# Patient Record
Sex: FEMALE | Race: WHITE | NOT HISPANIC OR LATINO | Employment: OTHER | URBAN - METROPOLITAN AREA
[De-identification: names, ages, dates, MRNs, and addresses within clinical notes are randomized per-mention and may not be internally consistent; named-entity substitution may affect disease eponyms.]

---

## 2019-07-31 DIAGNOSIS — I10 HYPERTENSION, UNSPECIFIED TYPE: Primary | ICD-10-CM

## 2019-07-31 DIAGNOSIS — I10 HYPERTENSION, UNSPECIFIED TYPE: ICD-10-CM

## 2019-07-31 RX ORDER — LISINOPRIL 10 MG/1
10 TABLET ORAL DAILY
COMMUNITY
End: 2019-07-31 | Stop reason: SDUPTHER

## 2019-07-31 RX ORDER — LISINOPRIL 10 MG/1
10 TABLET ORAL DAILY
Qty: 90 TABLET | Refills: 0 | Status: SHIPPED | OUTPATIENT
Start: 2019-07-31 | End: 2019-07-31 | Stop reason: SDUPTHER

## 2019-07-31 RX ORDER — LISINOPRIL 10 MG/1
10 TABLET ORAL DAILY
Qty: 90 TABLET | Refills: 0 | Status: SHIPPED | OUTPATIENT
Start: 2019-07-31 | End: 2019-08-12

## 2019-08-12 ENCOUNTER — OFFICE VISIT (OUTPATIENT)
Dept: FAMILY MEDICINE CLINIC | Facility: CLINIC | Age: 79
End: 2019-08-12

## 2019-08-12 VITALS
HEART RATE: 68 BPM | BODY MASS INDEX: 21.97 KG/M2 | DIASTOLIC BLOOD PRESSURE: 80 MMHG | SYSTOLIC BLOOD PRESSURE: 142 MMHG | HEIGHT: 63 IN | TEMPERATURE: 98.2 F | OXYGEN SATURATION: 98 % | WEIGHT: 124 LBS

## 2019-08-12 DIAGNOSIS — Z00.00 MEDICARE ANNUAL WELLNESS VISIT, SUBSEQUENT: Primary | ICD-10-CM

## 2019-08-12 DIAGNOSIS — I10 ESSENTIAL HYPERTENSION: ICD-10-CM

## 2019-08-12 DIAGNOSIS — Z71.89 DNR (DO NOT RESUSCITATE) DISCUSSION: ICD-10-CM

## 2019-08-12 DIAGNOSIS — C56.9 PRIMARY MALIGNANT NEOPLASM OF OVARY, UNSPECIFIED LATERALITY (HCC): ICD-10-CM

## 2019-08-12 DIAGNOSIS — H61.23 BILATERAL IMPACTED CERUMEN: ICD-10-CM

## 2019-08-12 PROBLEM — C78.00: Status: ACTIVE | Noted: 2017-03-20

## 2019-08-12 PROBLEM — C79.31 MALIGNANT NEOPLASM METASTATIC TO BRAIN (HCC): Status: ACTIVE | Noted: 2017-03-17

## 2019-08-12 PROBLEM — C79.51 BONE METASTASES: Status: ACTIVE | Noted: 2017-08-31

## 2019-08-12 PROBLEM — Z79.899 MEDICATION MANAGEMENT: Status: ACTIVE | Noted: 2018-04-18

## 2019-08-12 PROCEDURE — 99214 OFFICE O/P EST MOD 30 MIN: CPT | Performed by: FAMILY MEDICINE

## 2019-08-12 PROCEDURE — G0439 PPPS, SUBSEQ VISIT: HCPCS | Performed by: FAMILY MEDICINE

## 2019-08-12 PROCEDURE — 69210 REMOVE IMPACTED EAR WAX UNI: CPT | Performed by: FAMILY MEDICINE

## 2019-08-12 NOTE — PROGRESS NOTES
The ABCs of the Annual Wellness Visit  Subsequent Medicare Wellness Visit    Chief Complaint   Patient presents with   • Hypertension     Follow up   • Ear Fullness       Subjective   History of Present Illness:  Kadie Payne is a 78 y.o. female who presents for a Subsequent Medicare Wellness Visit.    htn- doing well on meds, is mostly 110/70    Ovarian cancer- is on meds/chemo, following with onc. Has had radiation.     Ear fullness for 3 months.  Having trouble hearing.     Declining mammograms and colonoscopy etc. Her oncologist recommended she go with hospice but they wont let her be on chemo and she still wants this as it makes her feel better. Pt is a DNR.     HEALTH RISK ASSESSMENT    Recent Hospitalizations:  No hospitalization(s) within the last year.    Current Medical Providers:  Patient Care Team:  Alecia Panchal MD as PCP - General (Family Medicine)    Smoking Status:  Social History     Tobacco Use   Smoking Status Never Smoker   Smokeless Tobacco Never Used       Alcohol Consumption:  Social History     Substance and Sexual Activity   Alcohol Use No   • Frequency: Never       Depression Screen:   PHQ-2/PHQ-9 Depression Screening 8/12/2019   Little interest or pleasure in doing things 0   Feeling down, depressed, or hopeless 0   Trouble falling or staying asleep, or sleeping too much 1   Feeling tired or having little energy 0   Poor appetite or overeating 0   Feeling bad about yourself - or that you are a failure or have let yourself or your family down 0   Trouble concentrating on things, such as reading the newspaper or watching television 1   Moving or speaking so slowly that other people could have noticed. Or the opposite - being so fidgety or restless that you have been moving around a lot more than usual 0   Thoughts that you would be better off dead, or of hurting yourself in some way 0   Total Score 2   If you checked off any problems, how difficult have these problems made it for you to  do your work, take care of things at home, or get along with other people? -       Fall Risk Screen:  NISSA Fall Risk Assessment was completed, and patient is at MODERATE risk for falls. Assessment completed on:8/12/2019    Health Habits and Functional and Cognitive Screening:  Functional & Cognitive Status 8/12/2019   Do you have difficulty preparing food and eating? No   Do you have difficulty bathing yourself, getting dressed or grooming yourself? Yes   Do you have difficulty using the toilet? Yes   Do you have difficulty moving around from place to place? Yes   Do you have trouble with steps or getting out of a bed or a chair? Yes   Current Diet Well Balanced Diet   Dental Exam Not up to date   Eye Exam Not up to date   Exercise (times per week) 0 times per week   Do you need help using the phone?  No   Are you deaf or do you have serious difficulty hearing?  No   Do you need help with transportation? Yes   Do you need help shopping? Yes   Do you need help preparing meals?  Yes   Do you need help with housework?  Yes   Do you need help with laundry? Yes   Do you need help taking your medications? Yes   Do you need help managing money? Yes   Do you ever drive or ride in a car without wearing a seat belt? No   Have you felt unusual stress, anger or loneliness in the last month? No   Who do you live with? Spouse   If you need help, do you have trouble finding someone available to you? No   Have you been bothered in the last four weeks by sexual problems? No   Do you have difficulty concentrating, remembering or making decisions? Yes         Does the patient have evidence of cognitive impairment? Yes    Asprin use counseling:Does not need ASA (and currently is not on it)    Age-appropriate Screening Schedule:  Refer to the list below for future screening recommendations based on patient's age, sex and/or medical conditions. Orders for these recommended tests are listed in the plan section. The patient has been  "provided with a written plan.    Health Maintenance   Topic Date Due   • TDAP/TD VACCINES (1 - Tdap) 09/23/1961   • ZOSTER VACCINE (1 of 2) 09/23/1992   • PNEUMOCOCCAL VACCINES (65+ LOW/MEDIUM RISK) (1 of 2 - PCV13) 09/23/2007   • INFLUENZA VACCINE  08/01/2019          The following portions of the patient's history were reviewed and updated as appropriate: allergies, current medications, past family history, past medical history, past social history, past surgical history and problem list.    Outpatient Medications Prior to Visit   Medication Sig Dispense Refill   • lisinopril (PRINIVIL,ZESTRIL) 10 MG tablet Take 1 tablet by mouth Daily. 90 tablet 0     No facility-administered medications prior to visit.        Patient Active Problem List   Diagnosis   • Bone metastases (CMS/HCC)   • Carcinoma metastatic to lung (CMS/HCC)   • DNR (do not resuscitate) discussion   • Essential hypertension   • Malignant neoplasm metastatic to brain (CMS/HCC)   • Primary malignant neoplasm of ovary (CMS/HCC)   • Medication management   • Medicare annual wellness visit, subsequent   • Bilateral impacted cerumen       Advanced Care Planning:  Patient has an advance directive - a copy has not been provided. Have asked the patient to send this to us to add to record    Review of Systems   Respiratory: Negative for shortness of breath.    Cardiovascular: Negative for chest pain.       Compared to one year ago, the patient feels her physical health is worse.  Compared to one year ago, the patient feels her mental health is worse.    Reviewed chart for potential of high risk medication in the elderly: no  Reviewed chart for potential of harmful drug interactions in the elderly:no    Objective         Vitals:    08/12/19 1358   BP: 142/80   Pulse: 68   Temp: 98.2 °F (36.8 °C)   TempSrc: Temporal   SpO2: 98%   Weight: 56.2 kg (124 lb)   Height: 160 cm (63\")       Body mass index is 21.97 kg/m².  Discussed the patient's BMI with her. The BMI " is in the acceptable range.    Physical Exam   Constitutional: She is oriented to person, place, and time. She appears well-developed and well-nourished.   HENT:   Head: Normocephalic and atraumatic.   Cerumen impaction bilat.    Neck: Normal range of motion. Neck supple.   Cardiovascular: Normal rate, regular rhythm, normal heart sounds and intact distal pulses.   Pulmonary/Chest: Effort normal and breath sounds normal.   Musculoskeletal: She exhibits deformity.   In wheelchair, left sided paralysis.    Neurological: She is alert and oriented to person, place, and time.   Skin: Skin is warm and dry.   Psychiatric: She has a normal mood and affect. Her behavior is normal.             Assessment/Plan   Medicare Risks and Personalized Health Plan  CMS Preventative Services Quick Reference  Advance Directive Discussion    The above risks/problems have been discussed with the patient.  Pertinent information has been shared with the patient in the After Visit Summary.  Follow up plans and orders are seen below in the Assessment/Plan Section.    Diagnoses and all orders for this visit:    1. Medicare annual wellness visit, subsequent (Primary)    2. Essential hypertension    3. Primary malignant neoplasm of ovary, unspecified laterality (CMS/HCC)    4. Bilateral impacted cerumen  -     Ear Cerumen Removal    Other orders  -     Cancel: Lipid Panel  -     Cancel: Comprehensive Metabolic Panel      Follow Up:  Return in about 1 year (around 8/12/2020) for Medicare Wellness. Poor prognosis, following with onc. And as needed.     An After Visit Summary and PPPS were given to the patient.           Stop htn meds and monitor BP and f/u for highs.     Oral consent and good results with water irrigation and curette for cerumen impaction.

## 2019-12-03 ENCOUNTER — TELEPHONE (OUTPATIENT)
Dept: FAMILY MEDICINE CLINIC | Facility: CLINIC | Age: 79
End: 2019-12-03